# Patient Record
Sex: MALE | Race: WHITE
[De-identification: names, ages, dates, MRNs, and addresses within clinical notes are randomized per-mention and may not be internally consistent; named-entity substitution may affect disease eponyms.]

---

## 2017-08-19 ENCOUNTER — HOSPITAL ENCOUNTER (OUTPATIENT)
Dept: HOSPITAL 17 - NEPE | Age: 77
Setting detail: OBSERVATION
LOS: 1 days | Discharge: HOME | End: 2017-08-20
Attending: INTERNAL MEDICINE | Admitting: INTERNAL MEDICINE
Payer: COMMERCIAL

## 2017-08-19 VITALS
OXYGEN SATURATION: 93 % | SYSTOLIC BLOOD PRESSURE: 118 MMHG | DIASTOLIC BLOOD PRESSURE: 61 MMHG | HEART RATE: 56 BPM | RESPIRATION RATE: 18 BRPM | TEMPERATURE: 98.4 F

## 2017-08-19 VITALS
SYSTOLIC BLOOD PRESSURE: 119 MMHG | HEART RATE: 56 BPM | DIASTOLIC BLOOD PRESSURE: 57 MMHG | OXYGEN SATURATION: 96 % | RESPIRATION RATE: 16 BRPM

## 2017-08-19 VITALS
RESPIRATION RATE: 18 BRPM | HEIGHT: 64 IN | DIASTOLIC BLOOD PRESSURE: 61 MMHG | HEART RATE: 56 BPM | WEIGHT: 155.32 LBS | BODY MASS INDEX: 26.52 KG/M2 | SYSTOLIC BLOOD PRESSURE: 118 MMHG | OXYGEN SATURATION: 96 %

## 2017-08-19 VITALS — OXYGEN SATURATION: 96 %

## 2017-08-19 DIAGNOSIS — N18.9: ICD-10-CM

## 2017-08-19 DIAGNOSIS — Z79.82: ICD-10-CM

## 2017-08-19 DIAGNOSIS — D75.82: ICD-10-CM

## 2017-08-19 DIAGNOSIS — M19.90: ICD-10-CM

## 2017-08-19 DIAGNOSIS — F03.90: ICD-10-CM

## 2017-08-19 DIAGNOSIS — Z95.5: ICD-10-CM

## 2017-08-19 DIAGNOSIS — R07.89: Primary | ICD-10-CM

## 2017-08-19 DIAGNOSIS — I25.2: ICD-10-CM

## 2017-08-19 DIAGNOSIS — I50.9: ICD-10-CM

## 2017-08-19 DIAGNOSIS — Z74.01: ICD-10-CM

## 2017-08-19 DIAGNOSIS — I25.10: ICD-10-CM

## 2017-08-19 DIAGNOSIS — R94.31: ICD-10-CM

## 2017-08-19 DIAGNOSIS — Z87.891: ICD-10-CM

## 2017-08-19 DIAGNOSIS — J44.9: ICD-10-CM

## 2017-08-19 DIAGNOSIS — I83.90: ICD-10-CM

## 2017-08-19 DIAGNOSIS — K21.9: ICD-10-CM

## 2017-08-19 DIAGNOSIS — E78.5: ICD-10-CM

## 2017-08-19 DIAGNOSIS — I13.0: ICD-10-CM

## 2017-08-19 DIAGNOSIS — Z79.899: ICD-10-CM

## 2017-08-19 LAB
ANION GAP SERPL CALC-SCNC: 5 MEQ/L (ref 5–15)
APTT BLD: 24.5 SEC (ref 24.3–30.1)
BASOPHILS # BLD AUTO: 0.1 TH/MM3 (ref 0–0.2)
BASOPHILS NFR BLD: 1.1 % (ref 0–2)
BUN SERPL-MCNC: 29 MG/DL (ref 7–18)
CHLORIDE SERPL-SCNC: 109 MEQ/L (ref 98–107)
CK MB SERPL-MCNC: 2.5 NG/ML (ref 0.5–3.6)
CK SERPL-CCNC: 129 U/L (ref 39–308)
EOSINOPHIL # BLD: 0.5 TH/MM3 (ref 0–0.4)
EOSINOPHIL NFR BLD: 7.7 % (ref 0–4)
ERYTHROCYTE [DISTWIDTH] IN BLOOD BY AUTOMATED COUNT: 14.2 % (ref 11.6–17.2)
GFR SERPLBLD BASED ON 1.73 SQ M-ARVRAT: 41 ML/MIN (ref 89–?)
HCO3 BLD-SCNC: 29.8 MEQ/L (ref 21–32)
HCT VFR BLD CALC: 31.8 % (ref 39–51)
HEMO FLAGS: (no result)
INR PPP: 1 RATIO
LYMPHOCYTES # BLD AUTO: 1.5 TH/MM3 (ref 1–4.8)
LYMPHOCYTES NFR BLD AUTO: 24.9 % (ref 9–44)
MAGNESIUM SERPL-MCNC: 2.1 MG/DL (ref 1.5–2.5)
MCH RBC QN AUTO: 31.4 PG (ref 27–34)
MCHC RBC AUTO-ENTMCNC: 33.7 % (ref 32–36)
MCV RBC AUTO: 93.2 FL (ref 80–100)
MONOCYTES NFR BLD: 13.6 % (ref 0–8)
NEUTROPHILS # BLD AUTO: 3.1 TH/MM3 (ref 1.8–7.7)
NEUTROPHILS NFR BLD AUTO: 52.7 % (ref 16–70)
PLATELET # BLD: 144 TH/MM3 (ref 150–450)
POTASSIUM SERPL-SCNC: 4.2 MEQ/L (ref 3.5–5.1)
PROTHROMBIN TIME: 10.7 SEC (ref 9.8–11.6)
RBC # BLD AUTO: 3.41 MIL/MM3 (ref 4.5–5.9)
SODIUM SERPL-SCNC: 144 MEQ/L (ref 136–145)
WBC # BLD AUTO: 5.9 TH/MM3 (ref 4–11)

## 2017-08-19 PROCEDURE — 84484 ASSAY OF TROPONIN QUANT: CPT

## 2017-08-19 PROCEDURE — 85610 PROTHROMBIN TIME: CPT

## 2017-08-19 PROCEDURE — 96374 THER/PROPH/DIAG INJ IV PUSH: CPT

## 2017-08-19 PROCEDURE — 93005 ELECTROCARDIOGRAM TRACING: CPT

## 2017-08-19 PROCEDURE — 83735 ASSAY OF MAGNESIUM: CPT

## 2017-08-19 PROCEDURE — A9502 TC99M TETROFOSMIN: HCPCS

## 2017-08-19 PROCEDURE — 82552 ASSAY OF CPK IN BLOOD: CPT

## 2017-08-19 PROCEDURE — 82550 ASSAY OF CK (CPK): CPT

## 2017-08-19 PROCEDURE — 85730 THROMBOPLASTIN TIME PARTIAL: CPT

## 2017-08-19 PROCEDURE — G0378 HOSPITAL OBSERVATION PER HR: HCPCS

## 2017-08-19 PROCEDURE — 85025 COMPLETE CBC W/AUTO DIFF WBC: CPT

## 2017-08-19 PROCEDURE — 80048 BASIC METABOLIC PNL TOTAL CA: CPT

## 2017-08-19 PROCEDURE — 99285 EMERGENCY DEPT VISIT HI MDM: CPT

## 2017-08-19 PROCEDURE — 93017 CV STRESS TEST TRACING ONLY: CPT

## 2017-08-19 PROCEDURE — 71010: CPT

## 2017-08-19 PROCEDURE — 78452 HT MUSCLE IMAGE SPECT MULT: CPT

## 2017-08-19 RX ADMIN — Medication PRN ML: at 23:08

## 2017-08-19 NOTE — RADRPT
EXAM DATE/TIME:  08/19/2017 22:59 

 

[facility] COMPARISON:     

CHEST SINGLE AP, January 15, 2016, 9:05.

 

                     

INDICATIONS :     

Chest pain.

                     

 

MEDICAL HISTORY :     

Chronic obstructive pulmonary disease.       Asthma.   

 

SURGICAL HISTORY :     

Carotid stent.   

 

ENCOUNTER:     

Initial                                        

 

ACUITY:     

1 day      

 

PAIN SCORE:     

8/10

 

LOCATION:     

Bilateral chest 

 

FINDINGS:     

A single view of the chest demonstrates the lungs to be symmetrically aerated and clear biapical capp
ing. A 1 cm dense nodule projects over the left apex. Lungs are otherwise clear with minimal atelecta
tic changes above the hemidiaphragms. No effusions. Heart size is normal. Osseous structures are inta
ct with degenerative spurring of the dorsal spine.

 

CONCLUSION:     

1. Biapical capping with a dense 1 cm nodule projecting over the left apex. Increased density suggest
s a bony origin.

2. Except for some minimal atelectatic changes above the hemidiaphragms, lungs are otherwise clear.

 

 

 

 Wisam Wilcox MD on August 19, 2017 at 23:11           

Board Certified Radiologist.

 This report was verified electronically.

## 2017-08-20 VITALS
RESPIRATION RATE: 16 BRPM | HEART RATE: 55 BPM | DIASTOLIC BLOOD PRESSURE: 58 MMHG | OXYGEN SATURATION: 96 % | SYSTOLIC BLOOD PRESSURE: 111 MMHG

## 2017-08-20 VITALS
HEART RATE: 55 BPM | RESPIRATION RATE: 16 BRPM | TEMPERATURE: 98 F | SYSTOLIC BLOOD PRESSURE: 102 MMHG | DIASTOLIC BLOOD PRESSURE: 56 MMHG | OXYGEN SATURATION: 95 %

## 2017-08-20 VITALS
DIASTOLIC BLOOD PRESSURE: 65 MMHG | SYSTOLIC BLOOD PRESSURE: 117 MMHG | HEART RATE: 63 BPM | TEMPERATURE: 97.6 F | RESPIRATION RATE: 20 BRPM | OXYGEN SATURATION: 94 %

## 2017-08-20 VITALS
SYSTOLIC BLOOD PRESSURE: 110 MMHG | RESPIRATION RATE: 17 BRPM | DIASTOLIC BLOOD PRESSURE: 55 MMHG | OXYGEN SATURATION: 94 % | HEART RATE: 52 BPM | TEMPERATURE: 98.7 F

## 2017-08-20 VITALS — HEART RATE: 55 BPM

## 2017-08-20 VITALS — OXYGEN SATURATION: 93 %

## 2017-08-20 LAB
CK MB SERPL-MCNC: 2.3 NG/ML (ref 0.5–3.6)
CK MB SERPL-MCNC: 2.3 NG/ML (ref 0.5–3.6)
CK SERPL-CCNC: 112 U/L (ref 39–308)
CK SERPL-CCNC: 116 U/L (ref 39–308)

## 2017-08-20 RX ADMIN — Medication PRN ML: at 00:57

## 2017-08-20 RX ADMIN — Medication SCH ML: at 09:01

## 2017-08-20 RX ADMIN — Medication SCH ML: at 00:57

## 2017-08-20 RX ADMIN — Medication SCH ML: at 09:00

## 2017-08-20 NOTE — PD
HPI


.


Chest pain


Chief Complaint:  Chest Pain


Time Seen by Provider:  22:47


Travel History


International Travel<30 days:  No


Contact w/Intl Traveler<30days:  No


Traveled to known affect area:  No





History of Present Illness


HPI


This patient presents with a chief complaint of chest pain.  Onset was one hour 

ago.  He rates it as 7-8/10.  He describes it as a pressure-like sensation.  He 

has associated numbness and tingling in his left hand.  He has had some nausea 

and shortness of breath which are improving.  He was treated prior to arrival 

with sublingual not glycerin 3 without relief.  He states that he has been off 

of his aspirin for the last 6 days because of an impending epidural injection.  

He states that the pain awakened him from sleep.  He notes no modifying factors.





PFSH


Past Medical History


Arthritis:  Yes


Asthma:  Yes


Blood Disorders:  No


Anxiety:  Yes


Depression:  Yes


Heart Rhythm Problems:  No


Cancer:  No


Cardiac Catheterization:  Yes


Cardiovascular Problems:  No


High Cholesterol:  Yes


Chest Pain:  Yes


Congestive Heart Failure:  No


COPD:  Yes


Coronary Artery Disease:  Yes


Dementia:  Yes


Diabetes:  No


Diminished Hearing:  No


Endocrine:  No


Gastrointestinal Disorders:  Yes


GERD:  Yes


Genitourinary:  Yes (KIDNEY FUNCTION DECREASED)


Hiatal Hernia:  Yes


Immune Disorder:  No


Implanted Vascular Access Dvce:  Yes


Musculoskeletal:  Yes


Neurologic:  Yes


Psychiatric:  No


Reproductive:  No


Respiratory:  Yes


Immunizations Current:  Yes


Myocardial Infarction:  Yes


PNEUMOCCOCAL Vaccine (Year):  1





Past Surgical History


Abdominal Surgery:  Yes (HERNIA REPAIR)


Body Medical Devices:  pt has 5 stents


Cardiac Surgery:  Yes (stents)


Coronary Artery Bypass Graft:  No


Other Surgery:  Yes





Social History


Alcohol Use:  No


Tobacco Use:  No


Substance Use:  No





Allergies-Medications


(Allergen,Severity, Reaction):  


Coded Allergies:  


     No Known Allergies (Verified , 8/19/17)


Reported Meds & Prescriptions





Reported Meds & Active Scripts


Active


Reported


Celexa (Citalopram Hydrobromide) 10 Mg Tab 30 Mg PO DAILY


Donepezil 10 Mg Tab 10 Mg PO HS


Flonase Nasal Spray (Fluticasone Nasal Spray) 50 Mcg/Act Spray 50 Mcg EACH NARE 

BID


Metoprolol Succinate ER 24 HR (Metoprolol Succinate) 50 Mg Tab 50 Mg PO DAILY


Remeron (Mirtazapine) 15 Mg Tab 15 Mg PO HS


Aspirin 325 Mg Tab 325 Mg PO DAILY


Proventil Hfa 6.7 GM Inh (Albuterol Sulfate) 90 Mcg/Act Aer 2 Puff INH Q4-6H PRN


Nitroglycerin SL (Nitroglycerin) 0.4 Mg Subl 0.4 Mg SL AS DIRECTED PRN


     ONE TABLET UNDER THE TONGUE AS NEEDED FOR CHEST PAIN, MAY REPEAT EVERY


     FIVE MINUTES FOR A TOTAL OF 3 DOSES OR CALL 911 IF NO RELIEF


Atorvastatin (Atorvastatin Calcium) 40 Mg Tab 40 Mg PO HS








Review of Systems


Except as stated in HPI:  all other systems reviewed are Neg


Cardiovascular:  Positive: Chest Pain or Discomfort


Respiratory:  Positive: Shortness of Breath


Gastrointestinal:  Positive: Nausea,  No: Vomiting


Musculoskeletal:  Positive: Pain





Physical Exam


Narrative


GENERAL: Awake and alert and in no acute distress.


SKIN: Warm and dry.


HEAD: Atraumatic. Normocephalic. 


EYES: Pupils equal and round.  Extraocular movements are intact.


ENT: No nasal bleeding or discharge.  Mucous membranes pink and moist.


NECK: Trachea midline.  Neck is supple.


CARDIOVASCULAR: Regular rate and rhythm.  Heart sounds are normal.


RESPIRATORY: No accessory muscle use.  Lungs are clear with good air movement 

throughout.  Chest wall is nontender.


GASTROINTESTINAL: Abdomen soft, non-tender, nondistended. 


MUSCULOSKELETAL: No obvious deformities.   No edema. 


NEUROLOGICAL: Awake and alert. No obvious cranial nerve deficits.  Motor 

grossly within normal limits. Normal speech.


PSYCHIATRIC: Appropriate mood and affect; insight and judgment normal.





Data


Data


Last Documented VS





Vital Signs








  Date Time  Temp Pulse Resp B/P Pulse Ox O2 Delivery O2 Flow Rate FiO2


 


8/19/17 22:57     96 Room Air  


 


8/19/17 22:41 98.4 56 18 118/61    








Orders





 Basic Metabolic Panel (Bmp) (8/19/17 22:48)


Ckmb (Isoenzyme) Profile (8/19/17 22:48)


Complete Blood Count With Diff (8/19/17 22:48)


Magnesium (Mg) (8/19/17 22:48)


Prothrombin Time / Inr (Pt) (8/19/17 22:48)


Act Partial Throm Time (Ptt) (8/19/17 22:48)


Troponin I (8/19/17 22:48)


Chest, Single Ap (8/19/17 22:48)


Ecg Monitoring (8/19/17 22:48)


Iv Access Insert/Monitor (8/19/17 22:48)


Oximetry (8/19/17 22:48)


Oxygen Administration (8/19/17 22:48)


Aspirin Chew (Aspirin Chew) (8/19/17 23:00)


Morphine Inj (Morphine Inj) (8/19/17 23:00)


Nitroglycerin 2% Oint (Nitroglycerin 2% (8/19/17 23:00)


Sodium Chloride 0.9% Flush (Ns Flush) (8/19/17 23:00)


CKMB (8/19/17 22:55)


CKMB% (8/19/17 22:55)





Labs





 Laboratory Tests








Test 8/19/17





 22:55


 


White Blood Count 5.9 TH/MM3


 


Red Blood Count 3.41 MIL/MM3


 


Hemoglobin 10.7 GM/DL


 


Hematocrit 31.8 %


 


Mean Corpuscular Volume 93.2 FL


 


Mean Corpuscular Hemoglobin 31.4 PG


 


Mean Corpuscular Hemoglobin 33.7 %





Concent 


 


Red Cell Distribution Width 14.2 %


 


Platelet Count 144 TH/MM3


 


Mean Platelet Volume 8.9 FL


 


Neutrophils (%) (Auto) 52.7 %


 


Lymphocytes (%) (Auto) 24.9 %


 


Monocytes (%) (Auto) 13.6 %


 


Eosinophils (%) (Auto) 7.7 %


 


Basophils (%) (Auto) 1.1 %


 


Neutrophils # (Auto) 3.1 TH/MM3


 


Lymphocytes # (Auto) 1.5 TH/MM3


 


Monocytes # (Auto) 0.8 TH/MM3


 


Eosinophils # (Auto) 0.5 TH/MM3


 


Basophils # (Auto) 0.1 TH/MM3


 


CBC Comment DIFF FINAL 


 


Differential Comment  


 


Prothrombin Time 10.7 SEC


 


Prothromb Time International 1.0 RATIO





Ratio 


 


Activated Partial 24.5 SEC





Thromboplast Time 


 


Sodium Level 144 MEQ/L


 


Potassium Level 4.2 MEQ/L


 


Chloride Level 109 MEQ/L


 


Carbon Dioxide Level 29.8 MEQ/L


 


Anion Gap 5 MEQ/L


 


Blood Urea Nitrogen 29 MG/DL


 


Creatinine 1.62 MG/DL


 


Estimat Glomerular Filtration 41 ML/MIN





Rate 


 


Random Glucose 97 MG/DL


 


Calcium Level 8.2 MG/DL


 


Magnesium Level 2.1 MG/DL


 


Total Creatine Kinase 129 U/L


 


Creatine Kinase MB 2.5 NG/ML


 


Troponin I LESS THAN 0.02





 NG/ML











MDM


Medical Decision Making


Medical Screen Exam Complete:  Yes


Emergency Medical Condition:  Yes


Medical Record Reviewed:  Yes (this patient had a negative nuclear stress test 

on 1/16/16.)


Interpretation(s)


EKG showed a normal sinus rhythm with no acute ischemic changes.  His EKG was 

unchanged from previous.


Differential Diagnosis


Differential diagnosis of chest pain includes but is not limited to 

musculoskeletal pain, pulmonary embolism, acute coronary syndrome, pneumonia, 

pleurisy


Narrative Course


This patient presents with a chief complaint of chest pain.  He has reported 

known history of coronary artery disease.  He did have a negative nuclear 

stress test about a urinary half ago.  His chest pain wasn't not relieved by 

nitroglycerin prior to arrival.  He has had a routine cardiac workup.  The 

workup is negative.  He will be admitted to the chest pain center for further 

evaluation and treatment.





Diagnosis





 Primary Impression:  


 Chest pain


 Qualified Code:  R07.9 - Chest pain, unspecified type





Admitting Information


Admitting Physician Requests:  Observation


Condition:  Stable








Julia Strong MD Aug 20, 2017 00:19

## 2017-08-20 NOTE — RADRPT
EXAM DATE/TIME:  08/20/2017 12:39 

 

[facility] COMPARISON:     

MYOCARDIAL PERF PHARM SPECT, GATED W/EF, January 16, 2016, 9:24.

 

 

INDICATIONS :     

Chest pain with tingling in the left hand, nausea and dyspnea. Angina. 

                           

 

DOSE:     

25.7 mCi Tc99m Myoview at stress.

                     8.7 mCi Tc99m Myoview at rest.

                     0.4 mg Lexiscan

                       

 

 

STRESS SYMPTOMS:      

Dyspnea, lightheadedness and headache.

                       

 

 

EJECTION FRACTION:       

67%

                       

 

MEDICAL HISTORY :     

Myocardial infarction. Chronic obstructive pulmonary disease. Hypercholesterolemia. Renal failure.

 

SURGICAL HISTORY :      

Coronary artery stent.   Hiatal hernia repair.

 

ENCOUNTER:     

Initial

 

ACUITY:     

1 day

 

PAIN SCALE:     

8/10

 

LOCATION:       

chest 

 

TECHNIQUE:     

The patient underwent pharmacologic stress with infusion of prescribed dose.  Continuous ECG tracing 
was monitored during stress.  Gated SPECT imaging was performed after stress and conventional SPECT i
maging was performed at rest.  The examination was performed on a SPECT/CT scanner, both attenuation 
and non-corrected datasets were reviewed.

 

FINDINGS:     

 

DISTRIBUTION:     

The maximum perfused segment at stress is in the inferolateral wall.

 

PERFUSION STUDY:     

The pattern of perfusion at stress is within normal limits, with the exception of small area of mild 
reversibility within the lateral wall towards the base 

 

GATED STUDY:     

There is intact wall motion and thickening without hypokinetic or dyskinetic segments. 

 

CONCLUSION:     

1. Small area of mild reversibility in the lateral wall towards the base. 

2. Ejection fraction 67%.

 

RISK CATEGORY:     Low (<1% Annual Mortality Rate)

 

 

 

 

 Jacky Cole MD on August 20, 2017 at 14:45           

Board Certified Radiologist.

 This report was verified electronically.

## 2017-08-20 NOTE — HHI.DCPOC
Discharge Care Plan


Diagnosis:  


(1) Hypertension


(2) Chest pain


(3) Hx of coronary artery disease


(4) H/O heart artery stent


(5) Hyperlipidemia


(6) CRI (chronic renal insufficiency)


Goals to Promote Your Health


* To prevent worsening of your condition and complications


* To maintain your health at the optimal level


Directions to Meet Your Goals


*** Take your medications as prescribed


*** Follow your dietary instruction


*** Follow activity as directed








*** Keep your appointments as scheduled


*** Take your immunizations and boosters as scheduled


*** If your symptoms worsen call your PCP, if no PCP go to Urgent Care Center 

or Emergency Room***


*** Smoking is Dangerous to Your Health. Avoid second hand smoke***


***Call the 24-hour hour crisis hotline for domestic abuse at [phone]***











Adrian Plascencia Aug 20, 2017 16:07

## 2017-08-20 NOTE — EKG
Date Performed: 08/20/2017       Time Performed: 05:15:17

 

PTAGE:      77 years

 

EKG:      SINUS BRADYCARDIA POSSIBLE RIGHT VENTRICULAR CONDUCTION DELAY NONSPECIFIC T-WAVE ABNORMALIT
Y BORDERLINE ECG

 

PREVIOUS TRACING       : 08/20/2017 01.56 Since previous tracing, no significant change noted

 

DOCTOR:   Jesus Calhoun  Interpretating Date/Time  08/20/2017 16:36:00

## 2017-08-20 NOTE — HHI.HP
HPI


Primary Care Physician


Non-Staff


Chief Complaint


Chest pain


History of Present Illness


This is a 77-year-old male with history of CAD and stents presents to ED with a 

complaint of chest discomfort.  He states it began yesterday.  In some fresh 

trimming.  He cannot and waking from his nap he developed 3 quick pains left-

sided chest and that discomfort and turned into a heaviness which lasted for 

several hours.  He was short of breath, nauseous, and diaphoretic.  States it 

is similar to when needing stents in the past.  He follows Dr. Almonte 

cardiology on an outpatient basis and cannot recall recent stress testing in 

the office.  However upon review records she had a nonischemic Lexiscan 2016 at this facility.  He had a catheter in  showing disease but also 

showing patent stent.  Denies recent illness.  Denies fevers or chills.





Review of Systems


General: Patient denies fevers, chills recent, and recent travel


HEENT: Patient denies headache, sore throat, difficulty swallowing.  


Cardiovascular: Has the chest discomfort as mentioned above.  Denies sensation 

of heart beating rapidly or irregularly.  No syncope.  He was diaphoretic  


Respiratory: He was short of breath.  Denies inspirational chest discomfort.  

Denies coughing wheezing or hemoptysis.  


GI: He was nauseous.  Patient denies vomiting, diarrhea, abdominal pain, bloody 

stools.


Musculoskeletal: Patient denies joint pain or edema.  Denies calf pain or edema.


Neurovascular: Patient denies numbness, tingling, weakness in extremities.  

Denies headache.


Endocrine: Denies polyuria and polydipsia.


Hematologic: Denies easy bruising.


Skin: Denies rash or itching.





Past Family Social History


Allergies:  


Coded Allergies:  


     No Known Allergies (Verified , 17)


Past Medical History


Coronary disease with stenting.  Past history tobacco abuse but quit smoking in 

.  Hypertension, hyperlipidemia.  Denies diabetes.


Past Surgical History


Heart catheterizations with interventions.  Hernia repair.


Reported Medications





Reported Meds & Active Scripts


Active


Reported


Celexa (Citalopram Hydrobromide) 10 Mg Tab 30 Mg PO DAILY


Donepezil 10 Mg Tab 10 Mg PO HS


Flonase Nasal Spray (Fluticasone Nasal Spray) 50 Mcg/Act Spray 50 Mcg EACH NARE 

BID


Metoprolol Succinate ER 24 HR (Metoprolol Succinate) 50 Mg Tab 50 Mg PO DAILY


Remeron (Mirtazapine) 15 Mg Tab 15 Mg PO HS


Aspirin 325 Mg Tab 325 Mg PO DAILY


Proventil Hfa 6.7 GM Inh (Albuterol Sulfate) 90 Mcg/Act Aer 2 Puff INH Q4-6H PRN


Nitroglycerin SL (Nitroglycerin) 0.4 Mg Subl 0.4 Mg SL AS DIRECTED PRN


     ONE TABLET UNDER THE TONGUE AS NEEDED FOR CHEST PAIN, MAY REPEAT EVERY


     FIVE MINUTES FOR A TOTAL OF 3 DOSES OR CALL 911 IF NO RELIEF


Atorvastatin (Atorvastatin Calcium) 40 Mg Tab 40 Mg PO HS


Active Ordered Medications





Current Medications








 Medications


  (Trade)  Dose


 Ordered  Sig/Obed


 Route  Start Time


 Stop Time Status Last Admin


 


  (NS Flush)  2 ml  UNSCH  PRN


 IVF  17 23:00


    17 00:57


 


 


  (Zofran Inj)  4 mg  Q6H  PRN


 IV  17 00:30


    17 00:57


 


 


  (NS Flush)  2 ml  BID


 IV FLUSH  17 00:30


    17 00:57


 


 


  (Tylenol)  1,000 mg  Q8H  PRN


 PO  17 05:30


     


 


 


  (Norco  5-325 Mg)  1 tab  Q6H  PRN


 PO  17 05:45


     


 


 


  (Morphine Inj)  2 mg  Q2H  PRN


 IV  17 06:00


     


 








Family History


There is family history of CAD.


Social History


Patient quit smoking  but prior that he smoked one pack of cigarettes daily 

35 years.  Denies alcohol or illicit drugs.  He lives with his wife.





Physical Exam


Vital Signs





Vital Signs








  Date Time  Temp Pulse Resp B/P (MAP) Pulse Ox O2 Delivery O2 Flow Rate FiO2


 


17 07:50     93   21


 


17 07:38 98.0 55 16 102/56 (71) 95   


 


17 04:00  55      


 


17 02:08   18     


 


17 02:03 97.6 63 20 117/65 (82) 94   


 


17 00:30  55 16 111/58 (75) 96 Room Air  


 


17 23:00  56 16 119/57 (77) 96 Room Air  


 


17 22:57     96 Room Air  


 


17 22:57     96 Room Air  


 


17 22:41 98.4 56 18 118/61 (80) 93   


 


17 22:30  56 18 118/61 (80) 96 Room Air  








Physical Exam


GENERAL: This is a well-nourished, well-developed patient, in no apparent 

distress.  Patient speaks in clear complete sentences.  Patient is pleasant.


HEENT: Head is atraumatic and normocephalic.  Neck is supple without 

lymphadenopathy and trachea is midline.  No JVD or carotid bruits.


CARDIOVASCULAR: Regular rate and rhythm without murmurs, gallops, or rubs. 


RESPIRATORY: Clear to auscultation. Breath sounds equal bilaterally. No wheezes

, rales, or rhonchi.  Chest wall is nontender.  No use of accessory muscles.


GASTROINTESTINAL: Abdomen is nontender, nondistended.  Abdomen soft.  No 

obvious pulsatile mass or bruit.  No CVA tenderness.  Strong femoral pulses 

bilaterally.  Normal bowel sounds in all quadrants.


MUSCULOSKELETAL: Patient is moving upper and lower extremities freely.  No calf 

tenderness or edema, no Homans sign.  Strong pulses in upper and lower 

extremities.


NEUROLOGICAL: Patient is alert and oriented.  Cranial nerves 2-12 are grossly 

intact.  No focal deficits and speech is clear.


SKIN: No rash and turgor is normal.


Laboratory





Laboratory Tests








Test


  17


22:55 17


02:00 17


04:51


 


White Blood Count 5.9   


 


Red Blood Count 3.41   


 


Hemoglobin 10.7   


 


Hematocrit 31.8   


 


Mean Corpuscular Volume 93.2   


 


Mean Corpuscular Hemoglobin 31.4   


 


Mean Corpuscular Hemoglobin


Concent 33.7 


  


  


 


 


Red Cell Distribution Width 14.2   


 


Platelet Count 144   


 


Mean Platelet Volume 8.9   


 


Neutrophils (%) (Auto) 52.7   


 


Lymphocytes (%) (Auto) 24.9   


 


Monocytes (%) (Auto) 13.6   


 


Eosinophils (%) (Auto) 7.7   


 


Basophils (%) (Auto) 1.1   


 


Neutrophils # (Auto) 3.1   


 


Lymphocytes # (Auto) 1.5   


 


Monocytes # (Auto) 0.8   


 


Eosinophils # (Auto) 0.5   


 


Basophils # (Auto) 0.1   


 


CBC Comment DIFF FINAL   


 


Differential Comment    


 


Prothrombin Time 10.7   


 


Prothromb Time International


Ratio 1.0 


  


  


 


 


Activated Partial


Thromboplast Time 24.5 


  


  


 


 


Blood Urea Nitrogen 29   


 


Creatinine 1.62   


 


Random Glucose 97   


 


Calcium Level 8.2   


 


Magnesium Level 2.1   


 


Sodium Level 144   


 


Potassium Level 4.2   


 


Chloride Level 109   


 


Carbon Dioxide Level 29.8   


 


Anion Gap 5   


 


Estimat Glomerular Filtration


Rate 41 


  


  


 


 


Total Creatine Kinase 129  116  112 


 


Creatine Kinase MB 2.5  2.3  2.3 


 


Troponin I LESS THAN 0.02  LESS THAN 0.02  LESS THAN 0.02 








Result Diagram:  


17





Imaging





Last 48 hours Impressions








Chest X-Ray 17 Signed





Impressions: 





 Service Date/Time:  2017 22:59 - CONCLUSION:  1. Biapical 





 capping with a dense 1 cm nodule projecting over the left apex. Increased 





 density suggests a bony origin. 2. Except for some minimal atelectatic changes 





 above the hemidiaphragms, lungs are otherwise clear.     Wisam Wilcox MD 








Course


EKGs have sinus rhythm without significant ST segment depressions or elevations.





Caprini VTE Risk Assessment


Caprini VTE Risk Assessment:  Mod/High Risk (score >= 2)


Caprini Risk Assessment Model











 Point Value = 1          Point Value = 2  Point Value = 3  Point Value = 5


 


Age 41-60


Minor surgery


BMI > 25 kg/m2


Swollen legs


Varicose veins


Pregnancy or postpartum


History of unexplained or recurrent


   spontaneous 


Oral contraceptives or hormone


   replacement


Sepsis (< 1 month)


Serious lung disease, including


   pneumonia (< 1 month)


Abnormal pulmonary function


Acute myocardial infarction


Congestive heart failure (< 1 month)


History of inflammatory bowel disease


Medical patient at bed rest Age 61-74


Arthroscopic surgery


Major open surgery (> 45 min)


Laparoscopic surgery (> 45 min)


Malignancy


Confined to bed (> 72 hours)


Immobilizing plaster cast


Central venous access Age >= 75


History of VTE


Family history of VTE


Factor V Leiden


Prothrombin 39264R


Lupus anticoagulant


Anticardiolipin antibodies


Elevated serum homocysteine


Heparin-induced thrombocytopenia


Other congenital or acquired


   thrombophilia Stroke (< 1 month)


Elective arthroplasty


Hip, pelvis, or leg fracture


Acute spinal cord injury (< 1 month)








Prophylaxis Regimen











   Total Risk


Factor Score Risk Level Prophylaxis Regimen


 


0-1      Low Early ambulation


 


2 Moderate Order ONE of the following:


*Sequential Compression Device (SCD)


*Heparin 5000 units SQ BID


 


3-4 Higher Order ONE of the following medications:


*Heparin 5000 units SQ TID


*Enoxaparin/Lovenox 40 mg SQ daily (WT < 150 kg, CrCl > 30 mL/min)


*Enoxaparin/Lovenox 30 mg SQ daily (WT < 150 kg, CrCl > 10-29 mL/min)


*Enoxaparin/Lovenox 30 mg SQ BID (WT < 150 kg, CrCl > 30 mL/min)


AND/OR


*Sequential Compression Device (SCD)


 


5 or more Highest Order ONE of the following medications:


*Heparin 5000 units SQ TID (Preferred with Epidurals)


*Enoxaparin/Lovenox 40 mg SQ daily (WT < 150 kg, CrCl > 30 mL/min)


*Enoxaparin/Lovenox 30 mg SQ daily (WT < 150 kg, CrCl > 10-29 mL/min)


*Enoxaparin/Lovenox 30 mg SQ BID (WT < 150 kg, CrCl > 30 mL/min)


AND


*Sequential Compression Device (SCD)











Assessment and Plan


Assessment and Plan


* Chest pain: Patient does have history of CAD.  He has had serial cardiac 

enzymes and EKGs for ruling out purposes and will be seen by Dr. Calhoun of 

cardiology in the chest pain center.  He will undergo a Lexiscan myocardial 

perfusion stress test and will be discharged if stress test is nonischemic with 

instruction to follow-up with his PCP and cardiologist Dr. Almonte.


* Hypertension: Continue current medication.


* Hyperlipidemia: Continue current medication.


Patient is stable at this time.  He is agreeable to this plan.











Adrian Plascencia Aug 20, 2017 13:16

## 2017-08-20 NOTE — EKG
Date Performed: 08/20/2017       Time Performed: 01:56:13

 

PTAGE:      77 years

 

EKG:      SINUS BRADYCARDIA POSSIBLE RIGHT VENTRICULAR CONDUCTION DELAY NONSPECIFIC T-WAVE ABNORMALIT
Y BORDERLINE ECG

 

PREVIOUS TRACING       : 08/19/2017 22.44 Since previous tracing, no significant change noted

 

DOCTOR:   Jesus Calhoun  Interpretating Date/Time  08/20/2017 16:27:12

## 2017-08-20 NOTE — EKG
Date Performed: 08/19/2017       Time Performed: 22:44:37

 

PTAGE:      77 years

 

EKG:      SINUS BRADYCARDIA NONSPECIFIC T-WAVE ABNORMALITY BORDERLINE ECG

 

PREVIOUS TRACING       : 01/15/2016 16.01 Since previous tracing, no significant change noted

 

DOCTOR:   Jesus Calhoun  Interpretating Date/Time  08/20/2017 16:27:44

## 2017-08-21 NOTE — TR
Date Performed: 08/20/2017       Time Performed: 13:28:55

 

DOCTOR:      Jesus Calhoun 

 

DRUG LIST:     

CLINICAL HISTORY:      CHEST  PAIN CHEST  PAIN

REASON FOR TEST:      CHEST  PAIN

REASON FOR ENDING:     

OBSERVATION:     

CONCLUSION:      Lexiscan stress test was performed under standard four minute protocol.  Radionuclid
e was injected one minute prior to ending the test. No electrocardiographic abormalities were present
 to suggest ischemia. Nuclear imaging and interpretation are pending.

COMMENTS:

## 2018-01-03 ENCOUNTER — HOSPITAL ENCOUNTER (OUTPATIENT)
Dept: HOSPITAL 17 - NEPE | Age: 78
Setting detail: OBSERVATION
LOS: 1 days | Discharge: HOME | End: 2018-01-04
Attending: INTERNAL MEDICINE | Admitting: INTERNAL MEDICINE
Payer: COMMERCIAL

## 2018-01-03 VITALS — DIASTOLIC BLOOD PRESSURE: 63 MMHG | SYSTOLIC BLOOD PRESSURE: 121 MMHG

## 2018-01-03 VITALS — HEART RATE: 59 BPM

## 2018-01-03 VITALS
HEART RATE: 64 BPM | RESPIRATION RATE: 16 BRPM | OXYGEN SATURATION: 97 % | SYSTOLIC BLOOD PRESSURE: 110 MMHG | DIASTOLIC BLOOD PRESSURE: 60 MMHG

## 2018-01-03 VITALS — BODY MASS INDEX: 29.43 KG/M2 | WEIGHT: 149.91 LBS | HEIGHT: 60 IN

## 2018-01-03 VITALS
DIASTOLIC BLOOD PRESSURE: 62 MMHG | TEMPERATURE: 98.1 F | RESPIRATION RATE: 18 BRPM | OXYGEN SATURATION: 97 % | HEART RATE: 67 BPM | SYSTOLIC BLOOD PRESSURE: 110 MMHG

## 2018-01-03 VITALS
OXYGEN SATURATION: 98 % | SYSTOLIC BLOOD PRESSURE: 118 MMHG | HEART RATE: 64 BPM | RESPIRATION RATE: 15 BRPM | DIASTOLIC BLOOD PRESSURE: 57 MMHG

## 2018-01-03 VITALS
RESPIRATION RATE: 16 BRPM | HEART RATE: 61 BPM | DIASTOLIC BLOOD PRESSURE: 60 MMHG | SYSTOLIC BLOOD PRESSURE: 122 MMHG | TEMPERATURE: 98.4 F | OXYGEN SATURATION: 93 %

## 2018-01-03 DIAGNOSIS — N18.9: ICD-10-CM

## 2018-01-03 DIAGNOSIS — R11.2: ICD-10-CM

## 2018-01-03 DIAGNOSIS — I25.10: Primary | ICD-10-CM

## 2018-01-03 DIAGNOSIS — E78.5: ICD-10-CM

## 2018-01-03 DIAGNOSIS — I12.9: ICD-10-CM

## 2018-01-03 DIAGNOSIS — F32.9: ICD-10-CM

## 2018-01-03 DIAGNOSIS — J44.9: ICD-10-CM

## 2018-01-03 DIAGNOSIS — Z95.5: ICD-10-CM

## 2018-01-03 DIAGNOSIS — R55: ICD-10-CM

## 2018-01-03 DIAGNOSIS — Z79.899: ICD-10-CM

## 2018-01-03 DIAGNOSIS — F41.9: ICD-10-CM

## 2018-01-03 DIAGNOSIS — K21.9: ICD-10-CM

## 2018-01-03 DIAGNOSIS — M19.90: ICD-10-CM

## 2018-01-03 DIAGNOSIS — R42: ICD-10-CM

## 2018-01-03 DIAGNOSIS — I25.2: ICD-10-CM

## 2018-01-03 DIAGNOSIS — R00.1: ICD-10-CM

## 2018-01-03 LAB
ALBUMIN SERPL-MCNC: 3.6 GM/DL (ref 3.4–5)
ALP SERPL-CCNC: 73 U/L (ref 45–117)
ALT SERPL-CCNC: 24 U/L (ref 12–78)
AST SERPL-CCNC: 20 U/L (ref 15–37)
BASOPHILS # BLD AUTO: 0.1 TH/MM3 (ref 0–0.2)
BASOPHILS NFR BLD: 1 % (ref 0–2)
BILIRUB SERPL-MCNC: 0.3 MG/DL (ref 0.2–1)
BUN SERPL-MCNC: 33 MG/DL (ref 7–18)
CALCIUM SERPL-MCNC: 8.8 MG/DL (ref 8.5–10.1)
CHLORIDE SERPL-SCNC: 104 MEQ/L (ref 98–107)
CREAT SERPL-MCNC: 1.76 MG/DL (ref 0.6–1.3)
EOSINOPHIL # BLD: 0.3 TH/MM3 (ref 0–0.4)
EOSINOPHIL NFR BLD: 4.5 % (ref 0–4)
ERYTHROCYTE [DISTWIDTH] IN BLOOD BY AUTOMATED COUNT: 13.8 % (ref 11.6–17.2)
GFR SERPLBLD BASED ON 1.73 SQ M-ARVRAT: 38 ML/MIN (ref 89–?)
GLUCOSE SERPL-MCNC: 109 MG/DL (ref 74–106)
HCO3 BLD-SCNC: 28 MEQ/L (ref 21–32)
HCT VFR BLD CALC: 36.2 % (ref 39–51)
HGB BLD-MCNC: 12.2 GM/DL (ref 13–17)
INR PPP: 1 RATIO
LYMPHOCYTES # BLD AUTO: 1.1 TH/MM3 (ref 1–4.8)
LYMPHOCYTES NFR BLD AUTO: 18.2 % (ref 9–44)
MAGNESIUM SERPL-MCNC: 2.1 MG/DL (ref 1.5–2.5)
MCH RBC QN AUTO: 31.4 PG (ref 27–34)
MCHC RBC AUTO-ENTMCNC: 33.7 % (ref 32–36)
MCV RBC AUTO: 93.2 FL (ref 80–100)
MONOCYTE #: 0.7 TH/MM3 (ref 0–0.9)
MONOCYTES NFR BLD: 12.4 % (ref 0–8)
NEUTROPHILS # BLD AUTO: 3.8 TH/MM3 (ref 1.8–7.7)
NEUTROPHILS NFR BLD AUTO: 63.9 % (ref 16–70)
PLATELET # BLD: 170 TH/MM3 (ref 150–450)
PMV BLD AUTO: 9 FL (ref 7–11)
PROT SERPL-MCNC: 7.2 GM/DL (ref 6.4–8.2)
PROTHROMBIN TIME: 10.1 SEC (ref 9.8–11.6)
RBC # BLD AUTO: 3.89 MIL/MM3 (ref 4.5–5.9)
SODIUM SERPL-SCNC: 139 MEQ/L (ref 136–145)
TROPONIN I SERPL-MCNC: (no result) NG/ML (ref 0.02–0.05)
TROPONIN I SERPL-MCNC: (no result) NG/ML (ref 0.02–0.05)
WBC # BLD AUTO: 5.9 TH/MM3 (ref 4–11)

## 2018-01-03 PROCEDURE — 85610 PROTHROMBIN TIME: CPT

## 2018-01-03 PROCEDURE — 93005 ELECTROCARDIOGRAM TRACING: CPT

## 2018-01-03 PROCEDURE — 85025 COMPLETE CBC W/AUTO DIFF WBC: CPT

## 2018-01-03 PROCEDURE — 84484 ASSAY OF TROPONIN QUANT: CPT

## 2018-01-03 PROCEDURE — 83735 ASSAY OF MAGNESIUM: CPT

## 2018-01-03 PROCEDURE — G0378 HOSPITAL OBSERVATION PER HR: HCPCS

## 2018-01-03 PROCEDURE — 71045 X-RAY EXAM CHEST 1 VIEW: CPT

## 2018-01-03 PROCEDURE — 85730 THROMBOPLASTIN TIME PARTIAL: CPT

## 2018-01-03 PROCEDURE — 82552 ASSAY OF CPK IN BLOOD: CPT

## 2018-01-03 PROCEDURE — 80053 COMPREHEN METABOLIC PANEL: CPT

## 2018-01-03 PROCEDURE — 82550 ASSAY OF CK (CPK): CPT

## 2018-01-03 PROCEDURE — 99285 EMERGENCY DEPT VISIT HI MDM: CPT

## 2018-01-03 NOTE — PD
HPI


Chief Complaint:  Chest Pain


Time Seen by Provider:  18:28


Travel History


International Travel<30 days:  No


Contact w/Intl Traveler<30days:  No


Traveled to known affect area:  No





History of Present Illness


HPI


77-year-old female patient presents emergency department via EMS after having 

acute onset chest pain.  Patient states he was eating dinner at a local 

restaurant with pain started.  Pain is left midclavicular region and radiates 

down his left arm.  Patient describes pain as sharp and stabbing sensation and 

rates it at a 8 out of 10.  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 

Patient states he thought he was having another heart attack. He felt nauseous 

and vomited during the upset chest pain. He took 0.4mg SL Nitro with the pain 

initially started. Upon arrival to the emergency department all symptoms of 

chest pain had resolved and the patient reports no pain at this time. Patient 

takes 325mg ASA daily and took his medications already.





PFSH


Past Medical History


Arthritis:  Yes


Asthma:  Yes


Blood Disorders:  No


Anxiety:  Yes


Depression:  Yes


Heart Rhythm Problems:  Yes


Cancer:  No


Cardiac Catheterization:  Yes


Cardiovascular Problems:  Yes


High Cholesterol:  Yes


Chest Pain:  Yes


Congestive Heart Failure:  No


COPD:  Yes


Coronary Artery Disease:  Yes


Dementia:  Yes


Diabetes:  No


Diminished Hearing:  No


Endocrine:  No


Gastrointestinal Disorders:  Yes


GERD:  Yes


Genitourinary:  Yes (KIDNEY FUNCTION DECREASED)


Hiatal Hernia:  Yes


Hypertension:  Yes


Immune Disorder:  No


Implanted Vascular Access Dvce:  Yes


Musculoskeletal:  Yes


Neurologic:  Yes


Psychiatric:  No


Reproductive:  No


Respiratory:  Yes


Immunizations Current:  Yes


Myocardial Infarction:  Yes


PNEUMOCCOCAL Vaccine (Year):  1





Past Surgical History


Abdominal Surgery:  Yes (HERNIA REPAIR)


Body Medical Devices:  pt has 5 stents


Cardiac Surgery:  Yes (stents)


Coronary Artery Bypass Graft:  No


Other Surgery:  Yes





Family History


Family Myocardial Infarction:  Yes





Social History


Alcohol Use:  No


Tobacco Use:  No (smoker for about 30/40years)


Substance Use:  No





Allergies-Medications


(Allergen,Severity, Reaction):  


Coded Allergies:  


     No Known Allergies (Verified , 8/19/17)


Reported Meds & Prescriptions





Reported Meds & Active Scripts


Active


Reported


Celexa (Citalopram Hydrobromide) 10 Mg Tab 30 Mg PO DAILY


Donepezil 10 Mg Tab 10 Mg PO HS


Flonase Nasal Spray (Fluticasone Nasal Spray) 50 Mcg/Act Spray 50 Mcg EACH NARE 

BID


Metoprolol Succinate ER 24 HR (Metoprolol Succinate) 50 Mg Tab 50 Mg PO DAILY


Remeron (Mirtazapine) 15 Mg Tab 15 Mg PO HS


Aspirin 325 Mg Tab 325 Mg PO DAILY


Proventil Hfa 6.7 GM Inh (Albuterol Sulfate) 90 Mcg/Act Aer 2 Puff INH Q4-6H PRN


Nitroglycerin SL (Nitroglycerin) 0.4 Mg Subl 0.4 Mg SL AS DIRECTED PRN


     ONE TABLET UNDER THE TONGUE AS NEEDED FOR CHEST PAIN, MAY REPEAT EVERY


     FIVE MINUTES FOR A TOTAL OF 3 DOSES OR CALL 911 IF NO RELIEF


Atorvastatin (Atorvastatin Calcium) 40 Mg Tab 40 Mg PO HS








Review of Systems


Except as stated in HPI:  all other systems reviewed are Neg





Physical Exam


Narrative


GENERAL: Well-nourished, well-developed 77-year-old male in no acute distress.


SKIN: Focused skin assessment pale/warm/dry.


HEAD: Atraumatic. Normocephalic. 


EYES: Pupils equal and round. No scleral icterus. No injection or drainage. 


ENT: No nasal bleeding or discharge.  Mucous membranes pink and moist.


NECK: Trachea midline. No JVD. 


CARDIOVASCULAR: Regular rate and rhythm.  No murmur appreciated.


RESPIRATORY: No accessory muscle use. Clear to auscultation. Breath sounds 

equal bilaterally. 


GASTROINTESTINAL: Abdomen soft, non-tender, nondistended. Hepatic and splenic 

margins not palpable. 


MUSCULOSKELETAL: No obvious deformities. No clubbing.  No cyanosis.  No edema. 


NEUROLOGICAL: Awake and alert. No obvious cranial nerve deficits.  Motor 

grossly within normal limits. Normal speech.


PSYCHIATRIC: Appropriate mood and affect; insight and judgment normal.





Data


Data


Last Documented VS





Vital Signs








  Date Time  Temp Pulse Resp B/P (MAP) Pulse Ox O2 Delivery O2 Flow Rate FiO2


 


1/3/18 20:00  64 16 110/60 (77) 97 Nasal Cannula 2.00 


 


1/3/18 18:00 98.4       








Orders





 Orders


Electrocardiogram (1/3/18 18:38)


Complete Blood Count With Diff (1/3/18 18:38)


Comprehensive Metabolic Panel (1/3/18 18:38)


Magnesium (Mg) (1/3/18 18:38)


Prothrombin Time / Inr (Pt) (1/3/18 18:38)


Act Partial Throm Time (Ptt) (1/3/18 18:38)


Troponin I (1/3/18 18:38)


Chest, Single Ap (1/3/18 18:38)


Ecg Monitoring (1/3/18 18:38)


Bilateral Bp Monitoring (1/3/18 18:38)


Iv Access Insert/Monitor (1/3/18 18:38)


Oximetry (1/3/18 18:38)


Oxygen Administration (1/3/18 18:38)


Sodium Chloride 0.9% Flush (Ns Flush) (1/3/18 18:45)


Admit Order (Ed Use Only) (1/3/18 20:30)





Labs





Laboratory Tests








Test


  1/3/18


18:50


 


White Blood Count 5.9 TH/MM3 


 


Red Blood Count 3.89 MIL/MM3 


 


Hemoglobin 12.2 GM/DL 


 


Hematocrit 36.2 % 


 


Mean Corpuscular Volume 93.2 FL 


 


Mean Corpuscular Hemoglobin 31.4 PG 


 


Mean Corpuscular Hemoglobin


Concent 33.7 % 


 


 


Red Cell Distribution Width 13.8 % 


 


Platelet Count 170 TH/MM3 


 


Mean Platelet Volume 9.0 FL 


 


Neutrophils (%) (Auto) 63.9 % 


 


Lymphocytes (%) (Auto) 18.2 % 


 


Monocytes (%) (Auto) 12.4 % 


 


Eosinophils (%) (Auto) 4.5 % 


 


Basophils (%) (Auto) 1.0 % 


 


Neutrophils # (Auto) 3.8 TH/MM3 


 


Lymphocytes # (Auto) 1.1 TH/MM3 


 


Monocytes # (Auto) 0.7 TH/MM3 


 


Eosinophils # (Auto) 0.3 TH/MM3 


 


Basophils # (Auto) 0.1 TH/MM3 


 


CBC Comment DIFF FINAL 


 


Differential Comment  


 


Prothrombin Time 10.1 SEC 


 


Prothromb Time International


Ratio 1.0 RATIO 


 


 


Activated Partial


Thromboplast Time 21.9 SEC 


 


 


Blood Urea Nitrogen 33 MG/DL 


 


Creatinine 1.76 MG/DL 


 


Random Glucose 109 MG/DL 


 


Total Protein 7.2 GM/DL 


 


Albumin 3.6 GM/DL 


 


Calcium Level 8.8 MG/DL 


 


Magnesium Level 2.1 MG/DL 


 


Alkaline Phosphatase 73 U/L 


 


Aspartate Amino Transf


(AST/SGOT) 20 U/L 


 


 


Alanine Aminotransferase


(ALT/SGPT) 24 U/L 


 


 


Total Bilirubin 0.3 MG/DL 


 


Sodium Level 139 MEQ/L 


 


Potassium Level 4.2 MEQ/L 


 


Chloride Level 104 MEQ/L 


 


Carbon Dioxide Level 28.0 MEQ/L 


 


Anion Gap 7 MEQ/L 


 


Estimat Glomerular Filtration


Rate 38 ML/MIN 


 


 


Troponin I


  LESS THAN 0.02


NG/ML











Exceptions


Acute Myocardial Infarction


ASA Not Given on Arrival:  Already taken by patient





MDM


Medical Decision Making


Medical Screen Exam Complete:  Yes


Emergency Medical Condition:  Yes


Differential Diagnosis


Differential diagnoses include but are not limited to MI, arrhythmia, 

electrolyte abnormality, coronary spasm


Narrative Course


Patient place a monitor and IV obtained.  Blood work sent to lab.  CBC, CMP, 

magnesium, PT/INR, troponin ordered and pending.  Chest x-ray ordered and 

pending.  EKG ordered and interpreted.  EKG shows sinus bradycardia with 

occasional PVCs.  Heart rate 58.





CBC shows hemoglobin 12.2


CMP shows BUN 33, creatinine 1.76, GFR 38


Troponin is less than 0.02


Magnesium is 2.1


PT/INR shows APTT 21.9





Chest x-ray shows no acute cardiopulmonary disease.





Patient's medical records were reviewed and he has a stress test performed on 

August 20 of last year that showed no ischemic changes however due to his 

significant cardiac history and comorbidities it was advised by my attending, 

Dr. Galvan to admit the patient to chest pain center for further observation.

  Patient is admitted to the chest pain center at this time.





Diagnosis





 Primary Impression:  


 Chest pain with high risk for cardiac etiology





Admitting Information


Admitting Physician Requests:  Observation











Ember Bland Israel 3, 2018 20:05

## 2018-01-03 NOTE — RADRPT
EXAM DATE/TIME:  01/03/2018 19:04 

 

[facility] COMPARISON:     

CHEST SINGLE AP, August 19, 2017, 22:59.

 

                     

INDICATIONS :     

Chest pain starting today

                     

 

MEDICAL HISTORY :            

Chronic obstructive pulmonary disease. Asthma   

 

SURGICAL HISTORY :        

Carotid stent.

 

ENCOUNTER:     

Initial                                        

 

ACUITY:     

1 day      

 

PAIN SCORE:     

6/10

LOCATION:     Left chest 

 

FINDINGS:     

The lungs are clear without infiltrate, nodule, or mass.  There is no appreciable pleural effusion fo
r technique.  Heart and mediastinum are unremarkable.

 

CONCLUSION:         No acute cardiopulmonary disease.

 

 

 LORE Sheets MD on January 03, 2018 at 19:28           

Board Certified Radiologist.

 This report was verified electronically.

## 2018-01-04 VITALS
TEMPERATURE: 98.2 F | DIASTOLIC BLOOD PRESSURE: 57 MMHG | OXYGEN SATURATION: 96 % | RESPIRATION RATE: 20 BRPM | HEART RATE: 64 BPM | SYSTOLIC BLOOD PRESSURE: 119 MMHG

## 2018-01-04 VITALS
RESPIRATION RATE: 18 BRPM | OXYGEN SATURATION: 97 % | TEMPERATURE: 98.1 F | DIASTOLIC BLOOD PRESSURE: 58 MMHG | HEART RATE: 67 BPM | SYSTOLIC BLOOD PRESSURE: 117 MMHG

## 2018-01-04 VITALS — OXYGEN SATURATION: 95 %

## 2018-01-04 VITALS — SYSTOLIC BLOOD PRESSURE: 122 MMHG | HEART RATE: 68 BPM | DIASTOLIC BLOOD PRESSURE: 59 MMHG

## 2018-01-04 VITALS — HEART RATE: 64 BPM

## 2018-01-04 LAB — TROPONIN I SERPL-MCNC: (no result) NG/ML (ref 0.02–0.05)

## 2018-01-04 NOTE — EKG
Date Performed: 01/03/2018       Time Performed: 18:12:56

 

PTAGE:      77 years

 

EKG:      SINUS BRADYCARDIA WITH OCCASIONAL VENTRICULAR PREMATURE COMPLEXES POSSIBLE RIGHT VENTRICULA
R CONDUCTION DELAY BORDERLINE ECG Since 

 

 PREVIOUS TRACING            , no significant change noted

 

DOCTOR:   Samantha Almonte  Interpretating Date/Time  01/04/2018 19:29:13

## 2018-01-04 NOTE — HHI.HP
HPI


Primary Care Physician


Unknown


Chief Complaint


Chest pain


History of Present Illness


This is a 77-year-old male with history of CAD with stents that presents to ED 

with a complaint of 2 episodes of chest discomfort yesterday.  First episode 

began in the morning.  Left side rating down left arm.  Describe sharp.  Almost 

immediately took a nitroglycerin which resolved discomfort quickly.  No 

associated shortness of breath, nausea, or diaphoresis.  Then the discomfort 

recurred while he was having dinner yesterday evening.  He took another 

nitroglycerin.  Soon later felt dizzy and then he states he passed out.  When 

he woke back up.  Vomited a couple times.  The discomfort was gone.  Denies 

sensation of being rapidly or irregularly.  Denies headache.  Denies headache 

or with the incident.  States the discomfort does not feel similar to when 

eating a stent in the past.  States he follows Dr. Almonte cardiology.  

Upon reviewing records last stress test was 2017 revealing a small 

area of mild reversibility lateral wall towards the base.  Last cardiac 

catheterization was in .  LAD stent was patent RCA stent was patent.  

Circumflex had a 50% ostial stenosis.  Medical management.  Denies recent 

illness.  Denies fevers or chills.  States she's been compliant with his 

medications.





Review of Systems


General: Patient denies fevers, chills recent, and recent travel


HEENT: Patient denies headache, sore throat, difficulty swallowing.  


Cardiovascular: Has the chest discomfort as mentioned above.  Denies sensation 

of heart beating rapidly or irregularly.  States he passed out after taking 

nitroglycerin.  Denies diaphoresis.


Respiratory: Denies shortness of breath or inspirational chest discomfort.  

Denies coughing wheezing or hemoptysis.  


GI: Patient denies nausea, vomiting, diarrhea, abdominal pain, bloody stools.


Musculoskeletal: Patient denies joint pain or edema.  Denies calf pain or edema.


Neurovascular: Became dizzy has taken nitroglycerin and passed out.  Patient 

denies numbness, tingling, weakness in extremities.  Denies headache.


Endocrine: Denies polyuria and polydipsia.


Hematologic: Denies easy bruising.


Skin: Denies rash or itching.





Past Family Social History


Allergies:  


Coded Allergies:  


     No Known Allergies (Verified , 17)


Past Medical History


CAD with stents.  Hypertension, hyperlipidemia, chronic kidney disease.  Denies 

diabetes.


Past Surgical History


Cardiac catheterizations.  He has had stents.  Hernia repair.


Reported Medications





Reported Meds & Active Scripts


Active


Reported


Celexa (Citalopram Hydrobromide) 10 Mg Tab 30 Mg PO DAILY


Donepezil 10 Mg Tab 10 Mg PO HS


Flonase Nasal Spray (Fluticasone Nasal Spray) 50 Mcg/Act Spray 50 Mcg EACH NARE 

BID


Metoprolol Succinate ER 24 HR (Metoprolol Succinate) 50 Mg Tab 50 Mg PO DAILY


Remeron (Mirtazapine) 15 Mg Tab 15 Mg PO HS


Aspirin 325 Mg Tab 325 Mg PO DAILY


Proventil Hfa 6.7 GM Inh (Albuterol Sulfate) 90 Mcg/Act Aer 2 Puff INH Q4-6H PRN


Nitroglycerin SL (Nitroglycerin) 0.4 Mg Subl 0.4 Mg SL AS DIRECTED PRN


     ONE TABLET UNDER THE TONGUE AS NEEDED FOR CHEST PAIN, MAY REPEAT EVERY


     FIVE MINUTES FOR A TOTAL OF 3 DOSES OR CALL 911 IF NO RELIEF


Atorvastatin (Atorvastatin Calcium) 40 Mg Tab 40 Mg PO HS


Active Ordered Medications





Current Medications








 Medications


  (Trade)  Dose


 Ordered  Sig/Obed


 Route  Start Time


 Stop Time Status Last Admin


 


  (NS Flush)  2 ml  UNSCH  PRN


 IVF  1/3/18 18:45


     


 


 


  (NS Flush)  2 ml  UNSCH  PRN


 IV FLUSH  1/3/18 22:00


     


 


 


  (NS Flush)  2 ml  BID


 IV FLUSH  18 09:00


     


 


 


  (Aspirin)  325 mg  DAILY


 PO  18 09:00


     


 


 


  (Lipitor)  40 mg  HS


 PO  18 21:00


     


 


 


  (CeleXA)  30 mg  DAILY


 PO  18 09:00


     


 


 


  (Aricept)  10 mg  HS


 PO  18 21:00


     


 


 


  (Toprol Xl)  50 mg  DAILY


 PO  18 09:00


     


 


 


  (Remeron)  15 mg  HS


 PO  18 21:00


     


 


 


  (Pill Splitter)  1 ea  UNSCH  PRN


 OTHER  18 09:00


     


 








Family History


There is family history of CAD.


Social History


Not smoking .  Denies alcohol or illicit drugs.





Physical Exam


Vital Signs





Vital Signs








  Date Time  Temp Pulse Resp B/P (MAP) Pulse Ox O2 Delivery O2 Flow Rate FiO2


 


18 07:59     95 Nasal Cannula 2.00 


 


18 07:19 98.2 64 20 119/57 (77) 96   


 


18 07:01  64      


 


18 03:33 98.1 67 18 117/58 (77) 97   


 


1/3/18 23:26      Nasal Cannula 2.00 


 


1/3/18 23:22 98.1 67 18 110/62 (78) 97   


 


1/3/18 23:00  59      


 


1/3/18 22:05  63 16 121/63 (82) 96 Nasal Cannula 2.00 


 


1/3/18 20:00  64 16 110/60 (77) 97 Nasal Cannula 2.00 


 


1/3/18 19:00  64 15 118/57 (77) 98   


 


1/3/18 18:11  60 17  93 Room Air  


 


1/3/18 18:11     98 Nasal Cannula 2.00 


 


1/3/18 18:00 98.4 61 16 122/60 (80) 93   








Physical Exam


GENERAL: This is a well-nourished, well-developed patient, in no apparent 

distress.  Patient speaks in clear complete sentences.  Patient is pleasant.


HEENT: Head is atraumatic and normocephalic.  Neck is supple without 

lymphadenopathy and trachea is midline.  No JVD or carotid bruits.


CARDIOVASCULAR: Regular rate and rhythm without murmurs, gallops, or rubs. 


RESPIRATORY: Clear to auscultation. Breath sounds equal bilaterally. No wheezes

, rales, or rhonchi.  Chest wall is nontender.  No use of accessory muscles.


GASTROINTESTINAL: Abdomen is nontender, nondistended.  Abdomen soft.  No 

obvious pulsatile mass or bruit.  No CVA tenderness.  Strong femoral pulses 

bilaterally.  Normal bowel sounds in all quadrants.


MUSCULOSKELETAL: Patient is moving upper and lower extremities freely.  No calf 

tenderness or edema, no Homans sign.  Strong pulses in upper and lower 

extremities.


NEUROLOGICAL: Patient is alert and oriented.  Cranial nerves 2-12 are grossly 

intact.  No focal deficits and speech is clear.


SKIN: No rash and turgor is normal.


Laboratory





Laboratory Tests








Test


  1/3/18


18:50 1/3/18


22:00 18


01:23


 


White Blood Count 5.9   


 


Red Blood Count 3.89   


 


Hemoglobin 12.2   


 


Hematocrit 36.2   


 


Mean Corpuscular Volume 93.2   


 


Mean Corpuscular Hemoglobin 31.4   


 


Mean Corpuscular Hemoglobin


Concent 33.7 


  


  


 


 


Red Cell Distribution Width 13.8   


 


Platelet Count 170   


 


Mean Platelet Volume 9.0   


 


Neutrophils (%) (Auto) 63.9   


 


Lymphocytes (%) (Auto) 18.2   


 


Monocytes (%) (Auto) 12.4   


 


Eosinophils (%) (Auto) 4.5   


 


Basophils (%) (Auto) 1.0   


 


Neutrophils # (Auto) 3.8   


 


Lymphocytes # (Auto) 1.1   


 


Monocytes # (Auto) 0.7   


 


Eosinophils # (Auto) 0.3   


 


Basophils # (Auto) 0.1   


 


CBC Comment DIFF FINAL   


 


Differential Comment    


 


Prothrombin Time 10.1   


 


Prothromb Time International


Ratio 1.0 


  


  


 


 


Activated Partial


Thromboplast Time 21.9 


  


  


 


 


Blood Urea Nitrogen 33   


 


Creatinine 1.76   


 


Random Glucose 109   


 


Total Protein 7.2   


 


Albumin 3.6   


 


Calcium Level 8.8   


 


Magnesium Level 2.1   


 


Alkaline Phosphatase 73   


 


Aspartate Amino Transf


(AST/SGOT) 20 


  


  


 


 


Alanine Aminotransferase


(ALT/SGPT) 24 


  


  


 


 


Total Bilirubin 0.3   


 


Sodium Level 139   


 


Potassium Level 4.2   


 


Chloride Level 104   


 


Carbon Dioxide Level 28.0   


 


Anion Gap 7   


 


Estimat Glomerular Filtration


Rate 38 


  


  


 


 


Troponin I LESS THAN 0.02  LESS THAN 0.02  LESS THAN 0.02 


 


Total Creatine Kinase  125  133 


 


Creatine Kinase MB  2.5  2.4 








Result Diagram:  


1/3/18 1850                                                                    

            1/3/18 185





Imaging





Last 48 hours Impressions








Chest X-Ray 1/3/18 1838 Signed





Impressions: 





 Service Date/Time:  Wednesday, January 3, 2018 19:04 - CONCLUSION: No acute 





 cardiopulmonary disease.    LORE Sheets MD 








Course


EKGs have sinus rhythm with sinus bradycardia without significant ST segment 

depressions or elevations.





Caprini VTE Risk Assessment


Caprini VTE Risk Assessment:  Mod/High Risk (score >= 2)


Caprini Risk Assessment Model











 Point Value = 1          Point Value = 2  Point Value = 3  Point Value = 5


 


Age 41-60


Minor surgery


BMI > 25 kg/m2


Swollen legs


Varicose veins


Pregnancy or postpartum


History of unexplained or recurrent


   spontaneous 


Oral contraceptives or hormone


   replacement


Sepsis (< 1 month)


Serious lung disease, including


   pneumonia (< 1 month)


Abnormal pulmonary function


Acute myocardial infarction


Congestive heart failure (< 1 month)


History of inflammatory bowel disease


Medical patient at bed rest Age 61-74


Arthroscopic surgery


Major open surgery (> 45 min)


Laparoscopic surgery (> 45 min)


Malignancy


Confined to bed (> 72 hours)


Immobilizing plaster cast


Central venous access Age >= 75


History of VTE


Family history of VTE


Factor V Leiden


Prothrombin 22757A


Lupus anticoagulant


Anticardiolipin antibodies


Elevated serum homocysteine


Heparin-induced thrombocytopenia


Other congenital or acquired


   thrombophilia Stroke (< 1 month)


Elective arthroplasty


Hip, pelvis, or leg fracture


Acute spinal cord injury (< 1 month)








Prophylaxis Regimen











   Total Risk


Factor Score Risk Level Prophylaxis Regimen


 


0-1      Low Early ambulation


 


2 Moderate Order ONE of the following:


*Sequential Compression Device (SCD)


*Heparin 5000 units SQ BID


 


3-4 Higher Order ONE of the following medications:


*Heparin 5000 units SQ TID


*Enoxaparin/Lovenox 40 mg SQ daily (WT < 150 kg, CrCl > 30 mL/min)


*Enoxaparin/Lovenox 30 mg SQ daily (WT < 150 kg, CrCl > 10-29 mL/min)


*Enoxaparin/Lovenox 30 mg SQ BID (WT < 150 kg, CrCl > 30 mL/min)


AND/OR


*Sequential Compression Device (SCD)


 


5 or more Highest Order ONE of the following medications:


*Heparin 5000 units SQ TID (Preferred with Epidurals)


*Enoxaparin/Lovenox 40 mg SQ daily (WT < 150 kg, CrCl > 30 mL/min)


*Enoxaparin/Lovenox 30 mg SQ daily (WT < 150 kg, CrCl > 10-29 mL/min)


*Enoxaparin/Lovenox 30 mg SQ BID (WT < 150 kg, CrCl > 30 mL/min)


AND


*Sequential Compression Device (SCD)











Assessment and Plan


Assessment and Plan


* Chest pain: Patient had serial cardiac enzymes and EKGs for ruling out 

purposes.  He was seen by Dr. Almonte cardiology and the chest pain center.

  He had a small or moderate reversibility on a stress test in August of this 

year.  Plan options were discussed with the patient including cardiac 

catheterization.  He has chronic kidney disease and is thought this could put 

him in renal failure hemodialysis and patient does not want to entertain 

recatheterization at this time.  Second option was to adjust medications and 

third option was to keep him on medication he is at currently and follow him 

closely with Dr. Almonte in the office.  Patient states he's feeling much 

better now and would rather change nothing and follow back with Dr. Almonte.  Reviewing the monitor he went into a nonsustained PSVT about 8 

beats.  Dr. Alomnte is to arrange a Holter monitor to the office and see 

him in her office afterwards.  Patient's should certainly return to ED for 

interval issues.


* CAD: Continue medications.  Follow-up with Dr. Almonte.


* Hypertension: Continue current medication.


* Chronic kidney disease: Continue follow-up with his nephrologist.


* Hyperlipidemia: Continue current medication.


Patient is stable at this time.  He is agreeable to this plan.











Adrian Plascencia 2018 08:59

## 2018-01-04 NOTE — EKG
Date Performed: 2018       Time Performed: 21:59:50

 

PTAGE:      77 years

 

EKG:      SINUS BRADYCARDIA WITH OCCASIONAL VENTRICULAR PREMATURE COMPLEXES POSSIBLE RIGHT VENTRICULA
R CONDUCTION DELAY NONSPECIFIC T-WAVE ABNORMALITY BORDERLINE ECG Since 

 

 PREVIOUS TRACING            , no significant change noted PREVIOUS TRACIN2018 18.12

 

DOCTOR:   Samantha Almonte  Interpretating Date/Time  2018 19:30:37

## 2018-01-04 NOTE — EKG
Date Performed: 2018       Time Performed: 01:06:01

 

PTAGE:      77 years

 

EKG:      Sinus rhythm 

 

 POSSIBLE RIGHT VENTRICULAR CONDUCTION DELAY BORDERLINE ECG Since 

 

 PREVIOUS TRACING            , no significant change noted PREVIOUS TRACIN2018 21.59

 

DOCTOR:   Samantha Almonte  Interpretating Date/Time  2018 19:32:08

## 2018-01-04 NOTE — HHI.DCPOC
Discharge Care Plan


Diagnosis:  


(1) Chest pain


(2) CAD (coronary artery disease)


(3) H/O heart artery stent


(4) Hypertension


(5) Hyperlipidemia


(6) Chronic kidney disease


Goals to Promote Your Health


* To prevent worsening of your condition and complications


* To maintain your health at the optimal level


Directions to Meet Your Goals


*** Take your medications as prescribed


*** Follow your dietary instruction


*** Follow activity as directed








*** Keep your appointments as scheduled


*** Take your immunizations and boosters as scheduled


*** If your symptoms worsen call your PCP, if no PCP go to Urgent Care Center 

or Emergency Room***


*** Smoking is Dangerous to Your Health. Avoid second hand smoke***


***Call the 24-hour hour crisis hotline for domestic abuse at 1-585.859.5961***











Adrian Plascencia Jan 4, 2018 09:16